# Patient Record
Sex: FEMALE | Race: WHITE | NOT HISPANIC OR LATINO | Employment: UNEMPLOYED | URBAN - METROPOLITAN AREA
[De-identification: names, ages, dates, MRNs, and addresses within clinical notes are randomized per-mention and may not be internally consistent; named-entity substitution may affect disease eponyms.]

---

## 2022-08-17 ENCOUNTER — HOSPITAL ENCOUNTER (EMERGENCY)
Facility: MEDICAL CENTER | Age: 47
End: 2022-08-17
Attending: STUDENT IN AN ORGANIZED HEALTH CARE EDUCATION/TRAINING PROGRAM
Payer: MEDICAID

## 2022-08-17 ENCOUNTER — APPOINTMENT (OUTPATIENT)
Dept: RADIOLOGY | Facility: MEDICAL CENTER | Age: 47
End: 2022-08-17
Attending: STUDENT IN AN ORGANIZED HEALTH CARE EDUCATION/TRAINING PROGRAM
Payer: MEDICAID

## 2022-08-17 VITALS
TEMPERATURE: 96.8 F | DIASTOLIC BLOOD PRESSURE: 68 MMHG | SYSTOLIC BLOOD PRESSURE: 114 MMHG | RESPIRATION RATE: 18 BRPM | OXYGEN SATURATION: 96 % | WEIGHT: 173.06 LBS | HEART RATE: 90 BPM

## 2022-08-17 DIAGNOSIS — L97.511 CHRONIC ULCER OF RIGHT FOOT LIMITED TO BREAKDOWN OF SKIN (HCC): ICD-10-CM

## 2022-08-17 DIAGNOSIS — L97.521 CHRONIC ULCER OF LEFT FOOT LIMITED TO BREAKDOWN OF SKIN (HCC): ICD-10-CM

## 2022-08-17 DIAGNOSIS — L03.011 CELLULITIS OF FINGER OF RIGHT HAND: ICD-10-CM

## 2022-08-17 DIAGNOSIS — L03.012 CELLULITIS OF FINGER OF LEFT HAND: ICD-10-CM

## 2022-08-17 LAB
ALBUMIN SERPL BCP-MCNC: 4.5 G/DL (ref 3.2–4.9)
ALBUMIN/GLOB SERPL: 1.4 G/DL
ALP SERPL-CCNC: 65 U/L (ref 30–99)
ALT SERPL-CCNC: 32 U/L (ref 2–50)
ANION GAP SERPL CALC-SCNC: 13 MMOL/L (ref 7–16)
AST SERPL-CCNC: 24 U/L (ref 12–45)
BASOPHILS # BLD AUTO: 1.2 % (ref 0–1.8)
BASOPHILS # BLD: 0.17 K/UL (ref 0–0.12)
BILIRUB SERPL-MCNC: <0.2 MG/DL (ref 0.1–1.5)
BUN SERPL-MCNC: 12 MG/DL (ref 8–22)
CALCIUM SERPL-MCNC: 9.8 MG/DL (ref 8.5–10.5)
CHLORIDE SERPL-SCNC: 105 MMOL/L (ref 96–112)
CO2 SERPL-SCNC: 22 MMOL/L (ref 20–33)
CREAT SERPL-MCNC: 0.93 MG/DL (ref 0.5–1.4)
CRP SERPL HS-MCNC: <0.3 MG/DL (ref 0–0.75)
EOSINOPHIL # BLD AUTO: 0.33 K/UL (ref 0–0.51)
EOSINOPHIL NFR BLD: 2.4 % (ref 0–6.9)
ERYTHROCYTE [DISTWIDTH] IN BLOOD BY AUTOMATED COUNT: 42.5 FL (ref 35.9–50)
GFR SERPLBLD CREATININE-BSD FMLA CKD-EPI: 77 ML/MIN/1.73 M 2
GLOBULIN SER CALC-MCNC: 3.2 G/DL (ref 1.9–3.5)
GLUCOSE SERPL-MCNC: 82 MG/DL (ref 65–99)
HCT VFR BLD AUTO: 44.3 % (ref 37–47)
HGB BLD-MCNC: 15.1 G/DL (ref 12–16)
IMM GRANULOCYTES # BLD AUTO: 0.06 K/UL (ref 0–0.11)
IMM GRANULOCYTES NFR BLD AUTO: 0.4 % (ref 0–0.9)
LYMPHOCYTES # BLD AUTO: 4.78 K/UL (ref 1–4.8)
LYMPHOCYTES NFR BLD: 34.6 % (ref 22–41)
MCH RBC QN AUTO: 31.1 PG (ref 27–33)
MCHC RBC AUTO-ENTMCNC: 34.1 G/DL (ref 33.6–35)
MCV RBC AUTO: 91.2 FL (ref 81.4–97.8)
MONOCYTES # BLD AUTO: 0.53 K/UL (ref 0–0.85)
MONOCYTES NFR BLD AUTO: 3.8 % (ref 0–13.4)
NEUTROPHILS # BLD AUTO: 7.95 K/UL (ref 2–7.15)
NEUTROPHILS NFR BLD: 57.6 % (ref 44–72)
NRBC # BLD AUTO: 0 K/UL
NRBC BLD-RTO: 0 /100 WBC
PLATELET # BLD AUTO: 398 K/UL (ref 164–446)
PMV BLD AUTO: 8.2 FL (ref 9–12.9)
POTASSIUM SERPL-SCNC: 3.7 MMOL/L (ref 3.6–5.5)
PROT SERPL-MCNC: 7.7 G/DL (ref 6–8.2)
RBC # BLD AUTO: 4.86 M/UL (ref 4.2–5.4)
SODIUM SERPL-SCNC: 140 MMOL/L (ref 135–145)
WBC # BLD AUTO: 13.8 K/UL (ref 4.8–10.8)

## 2022-08-17 PROCEDURE — 85025 COMPLETE CBC W/AUTO DIFF WBC: CPT

## 2022-08-17 PROCEDURE — A9270 NON-COVERED ITEM OR SERVICE: HCPCS | Performed by: STUDENT IN AN ORGANIZED HEALTH CARE EDUCATION/TRAINING PROGRAM

## 2022-08-17 PROCEDURE — 36415 COLL VENOUS BLD VENIPUNCTURE: CPT

## 2022-08-17 PROCEDURE — 73130 X-RAY EXAM OF HAND: CPT | Mod: LT

## 2022-08-17 PROCEDURE — 85652 RBC SED RATE AUTOMATED: CPT

## 2022-08-17 PROCEDURE — 86140 C-REACTIVE PROTEIN: CPT

## 2022-08-17 PROCEDURE — 73130 X-RAY EXAM OF HAND: CPT | Mod: RT

## 2022-08-17 PROCEDURE — 80053 COMPREHEN METABOLIC PANEL: CPT

## 2022-08-17 PROCEDURE — 99284 EMERGENCY DEPT VISIT MOD MDM: CPT

## 2022-08-17 PROCEDURE — 700102 HCHG RX REV CODE 250 W/ 637 OVERRIDE(OP): Performed by: STUDENT IN AN ORGANIZED HEALTH CARE EDUCATION/TRAINING PROGRAM

## 2022-08-17 RX ORDER — SULFAMETHOXAZOLE AND TRIMETHOPRIM 800; 160 MG/1; MG/1
1 TABLET ORAL 2 TIMES DAILY
Qty: 14 TABLET | Refills: 0 | Status: SHIPPED | OUTPATIENT
Start: 2022-08-17 | End: 2022-08-25 | Stop reason: SDUPTHER

## 2022-08-17 RX ORDER — CEPHALEXIN 500 MG/1
500 CAPSULE ORAL 4 TIMES DAILY
Qty: 28 CAPSULE | Refills: 0 | Status: SHIPPED | OUTPATIENT
Start: 2022-08-17 | End: 2022-08-25 | Stop reason: SDUPTHER

## 2022-08-17 RX ORDER — OXYCODONE HYDROCHLORIDE 5 MG/1
10 TABLET ORAL EVERY 4 HOURS PRN
Status: DISCONTINUED | OUTPATIENT
Start: 2022-08-17 | End: 2022-08-18 | Stop reason: HOSPADM

## 2022-08-17 RX ORDER — SULFAMETHOXAZOLE AND TRIMETHOPRIM 800; 160 MG/1; MG/1
1 TABLET ORAL ONCE
Status: COMPLETED | OUTPATIENT
Start: 2022-08-17 | End: 2022-08-17

## 2022-08-17 RX ORDER — CEPHALEXIN 500 MG/1
500 CAPSULE ORAL ONCE
Status: COMPLETED | OUTPATIENT
Start: 2022-08-17 | End: 2022-08-17

## 2022-08-17 RX ADMIN — CEPHALEXIN 500 MG: 500 CAPSULE ORAL at 22:25

## 2022-08-17 RX ADMIN — SULFAMETHOXAZOLE AND TRIMETHOPRIM 1 TABLET: 800; 160 TABLET ORAL at 22:25

## 2022-08-17 RX ADMIN — OXYCODONE 10 MG: 5 TABLET ORAL at 21:48

## 2022-08-18 ENCOUNTER — PHARMACY VISIT (OUTPATIENT)
Dept: PHARMACY | Facility: MEDICAL CENTER | Age: 47
End: 2022-08-18
Payer: COMMERCIAL

## 2022-08-18 LAB — ERYTHROCYTE [SEDIMENTATION RATE] IN BLOOD BY WESTERGREN METHOD: 10 MM/HOUR (ref 0–25)

## 2022-08-18 PROCEDURE — RXMED WILLOW AMBULATORY MEDICATION CHARGE: Performed by: STUDENT IN AN ORGANIZED HEALTH CARE EDUCATION/TRAINING PROGRAM
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
## 2022-08-18 NOTE — ED TRIAGE NOTES
Pt to triage .  Chief Complaint   Patient presents with    Digit Pain    Wound Check     Pt c/o left index finger and right thumb pain/swelling/wound/drainage     Foot Pain     Pt also c/o right heel pain/wound     Pt admits to skin popping meth

## 2022-08-18 NOTE — ED NOTES
Phlebotomist in room attempting to get blood from pt.    Airway patent, Nasal mucosa clear. Mouth with normal mucosa. Throat has no vesicles, no oropharyngeal exudates and uvula is midline.

## 2022-08-18 NOTE — ED NOTES
Pt discharged with discharge instructions provided. Pt given medications to be filled and education. Pt verbalized an understanding. Pt was set up with  for cab. Pt IV removed, angio intact and pressure dressing applied. Pt ambulated out with a steady gait.
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
## 2022-08-18 NOTE — DISCHARGE INSTRUCTIONS
Your wounds do appear to be improving.  It is very important that you follow-up as scheduled with the wound clinic to ensure proper healing.  Please return to the ER if you have worsening pain, spreading redness, fevers, chills, nausea or vomiting.  Please return if you are unable to take your oral antibiotics.    For pain, take Ibuprofen (Motrin, Advil) 600 mg up to every six hours  mg up to every eight hours if your stomach can take it. Instead of Ibuprofen, you can choose Naproxen (Aleve) and take 250-500 mg twice daily. Take Ibuprofen or Naproxen with food to lessen stomach irritation. You may also take Acetaminophen (Tylenol) 500mg (may take up to 1gm) every six hours in addition to Ibuprofen or Naproxen. (Maximum Tylenol 4 gm/day).  I suggest alternating between the ibuprofen and Tylenol every 4 hours for optimal pain relief and adequate spacing of each medication.

## 2022-08-18 NOTE — DISCHARGE PLANNING
ERP requesting assistance in helping Pt pay for her antibiotics.     GEOVANNI met with Pt. Pt is homeless and does not have acitve Medicaid. Pt is staying at the [facility].   GEOVANNI completed and Approved Service for Antibiotics. ERP will send prescription to [facility].   Pt made aware to pick medications up in the afternoon.   GEOVANNI gave Pt cab vouchers  Return to [facility] on discharge.[id number]  [facility] to pharmacy [phone]  Pharmacy to [facility] [phone]    GEOVANNI will give Approved Service information to AM GEOVANNI Uribe to complete and fax to pharmacy.

## 2022-08-22 PROCEDURE — RXMED WILLOW AMBULATORY MEDICATION CHARGE: Performed by: PSYCHIATRY & NEUROLOGY

## 2022-08-24 ENCOUNTER — PHARMACY VISIT (OUTPATIENT)
Dept: PHARMACY | Facility: MEDICAL CENTER | Age: 47
End: 2022-08-24
Payer: COMMERCIAL

## 2022-08-25 PROCEDURE — RXMED WILLOW AMBULATORY MEDICATION CHARGE: Performed by: NURSE PRACTITIONER

## 2022-08-27 ENCOUNTER — PHARMACY VISIT (OUTPATIENT)
Dept: PHARMACY | Facility: MEDICAL CENTER | Age: 47
End: 2022-08-27
Payer: COMMERCIAL

## 2022-09-19 PROCEDURE — RXMED WILLOW AMBULATORY MEDICATION CHARGE: Performed by: PSYCHIATRY & NEUROLOGY

## 2022-09-25 ENCOUNTER — PHARMACY VISIT (OUTPATIENT)
Dept: PHARMACY | Facility: MEDICAL CENTER | Age: 47
End: 2022-09-25
Payer: COMMERCIAL

## 2022-10-31 ENCOUNTER — APPOINTMENT (OUTPATIENT)
Dept: LAB | Facility: MEDICAL CENTER | Age: 47
End: 2022-10-31
Payer: MEDICAID

## 2022-11-10 ENCOUNTER — OFFICE VISIT (OUTPATIENT)
Dept: URGENT CARE | Facility: CLINIC | Age: 47
End: 2022-11-10
Payer: MEDICAID

## 2022-11-10 VITALS
OXYGEN SATURATION: 97 % | BODY MASS INDEX: 27.45 KG/M2 | WEIGHT: 181.1 LBS | HEART RATE: 80 BPM | HEIGHT: 68 IN | SYSTOLIC BLOOD PRESSURE: 108 MMHG | DIASTOLIC BLOOD PRESSURE: 60 MMHG | TEMPERATURE: 97.1 F | RESPIRATION RATE: 16 BRPM

## 2022-11-10 DIAGNOSIS — K08.89 DENTALGIA: ICD-10-CM

## 2022-11-10 PROCEDURE — 99203 OFFICE O/P NEW LOW 30 MIN: CPT | Performed by: PHYSICIAN ASSISTANT

## 2022-11-10 RX ORDER — IBUPROFEN 600 MG/1
600 TABLET ORAL EVERY 6 HOURS PRN
Qty: 30 TABLET | Refills: 1 | Status: SHIPPED | OUTPATIENT
Start: 2022-11-10 | End: 2022-11-24 | Stop reason: SDUPTHER

## 2022-11-10 RX ORDER — KETOROLAC TROMETHAMINE 30 MG/ML
30 INJECTION, SOLUTION INTRAMUSCULAR; INTRAVENOUS ONCE
Status: COMPLETED | OUTPATIENT
Start: 2022-11-10 | End: 2022-11-10

## 2022-11-10 RX ORDER — AMOXICILLIN AND CLAVULANATE POTASSIUM 875; 125 MG/1; MG/1
1 TABLET, FILM COATED ORAL 2 TIMES DAILY
Qty: 20 TABLET | Refills: 0 | Status: SHIPPED | OUTPATIENT
Start: 2022-11-10 | End: 2023-06-19

## 2022-11-10 RX ADMIN — KETOROLAC TROMETHAMINE 30 MG: 30 INJECTION, SOLUTION INTRAMUSCULAR; INTRAVENOUS at 18:23

## 2022-11-10 ASSESSMENT — ENCOUNTER SYMPTOMS
VOMITING: 0
NAUSEA: 0
CHILLS: 0
HEADACHES: 1
FEVER: 0
DIZZINESS: 0

## 2022-11-10 ASSESSMENT — FIBROSIS 4 INDEX: FIB4 SCORE: 0.49

## 2022-11-11 NOTE — PROGRESS NOTES
"  Subjective:     Geri Zamora  is a 47 y.o. female who presents for Tooth Ache (Pt has pain on upper (R) tooth x yesterday )      Other  This is a new problem. The current episode started today. Associated symptoms include headaches. Pertinent negatives include no chills, fever, nausea, rash or vomiting.     Patient presents urgent care noting broken tooth right upper jawline.  She had needed a course of antibiotics around 3 weeks ago for this which did help with pain significantly.  She did have a broken tooth in this area and a \"chunk fell out just over a month ago\".  She denies fevers chills she has had headache.  Denies dizziness.  Complains of mild discomfort to ears.  Notes tenderness over right upper jaw that radiates to her right maxillary sinus as well as some to right lower jaw.  Patient has a scheduled follow-up appointment with dentist and 3 weeks.    Review of Systems   Constitutional:  Negative for chills and fever.   HENT:  Positive for ear pain.         Dental pain   Gastrointestinal:  Negative for nausea and vomiting.   Skin:  Negative for rash.   Neurological:  Positive for headaches. Negative for dizziness.     Medications:    aripiprazole  cephALEXin Caps  DULoxetine Cpep  sulfamethoxazole-trimethoprim    Allergies: Doxycycline, Flexeril [cyclobenzaprine], Gabapentin (phn), Reglan [metoclopramide], and Tramadol    Problem List: Geri Zamora does not have a problem list on file.    Surgical History:  No past surgical history on file.    Past Social Hx: Geri Zamora  reports that she has never smoked. She has never used smokeless tobacco. She reports that she does not currently use alcohol. She reports that she does not currently use drugs after having used the following drugs: Injected (Skin Popping).     Past Family Hx:  Geri Zamora family history is not on file.     Problem list, medications, and allergies reviewed by myself today " "in Epic.     Objective:   /60 (BP Location: Left arm, Patient Position: Sitting, BP Cuff Size: Adult)   Pulse 80   Temp 36.2 °C (97.1 °F) (Temporal)   Resp 16   Ht 1.715 m (5' 7.5\")   Wt 82.1 kg (181 lb 1.6 oz)   SpO2 97%   BMI 27.95 kg/m²     Physical Exam  Vitals and nursing note reviewed.   Constitutional:       General: She is not in acute distress.     Appearance: She is well-developed. She is not diaphoretic.   HENT:      Head: Normocephalic and atraumatic.      Right Ear: Tympanic membrane, ear canal and external ear normal.      Left Ear: Tympanic membrane, ear canal and external ear normal.      Nose: Nose normal.      Mouth/Throat:      Lips: Pink.      Mouth: Mucous membranes are moist.      Dentition: Abnormal dentition. Does not have dentures. Dental tenderness and dental caries present. No gingival swelling, dental abscesses or gum lesions.      Pharynx: Uvula midline. Posterior oropharyngeal erythema ( mild PND) present. No oropharyngeal exudate.      Tonsils: No tonsillar abscesses.        Comments: Broken tooth right upper  Eyes:      General: Lids are normal. No scleral icterus.        Right eye: No discharge.         Left eye: No discharge.      Conjunctiva/sclera: Conjunctivae normal.   Pulmonary:      Effort: Pulmonary effort is normal. No respiratory distress.      Breath sounds: Normal breath sounds. No stridor. No decreased breath sounds, wheezing, rhonchi or rales.   Musculoskeletal:         General: Normal range of motion.      Cervical back: Neck supple.   Skin:     General: Skin is warm and dry.      Coloration: Skin is not pale.      Findings: No erythema.   Neurological:      Mental Status: She is alert and oriented to person, place, and time. She is not disoriented.   Psychiatric:         Speech: Speech normal.         Behavior: Behavior normal.     Toradol 30 mg IM-tolerates well    Assessment/Plan:   Assessment      1. Dentalgia  - amoxicillin-clavulanate (AUGMENTIN) " 875-125 MG Tab; Take 1 Tablet by mouth 2 times a day.  Dispense: 20 Tablet; Refill: 0  - ketorolac (TORADOL) injection 30 mg  - ibuprofen (MOTRIN) 600 MG Tab; Take 1 Tablet by mouth every 6 hours as needed for Inflammation or Moderate Pain.  Dispense: 30 Tablet; Refill: 1  Supportive care is reviewed with patient/caregiver - recommend to push PO fluids and electrolytes,  take full course of Rx, take with probiotics, observe for resolution  Return to clinic with lack of resolution or progression of symptoms.  F/u w/ dentist    I have worn an N95 mask, gloves and eye protection for the entire encounter with this patient.     Differential diagnosis, natural history, supportive care, and indications for immediate follow-up discussed.

## 2022-11-24 ENCOUNTER — HOSPITAL ENCOUNTER (EMERGENCY)
Facility: MEDICAL CENTER | Age: 47
End: 2022-11-24
Attending: EMERGENCY MEDICINE
Payer: MEDICAID

## 2022-11-24 ENCOUNTER — APPOINTMENT (OUTPATIENT)
Dept: RADIOLOGY | Facility: MEDICAL CENTER | Age: 47
End: 2022-11-24
Attending: EMERGENCY MEDICINE
Payer: MEDICAID

## 2022-11-24 VITALS
RESPIRATION RATE: 14 BRPM | TEMPERATURE: 98.5 F | WEIGHT: 183.64 LBS | SYSTOLIC BLOOD PRESSURE: 115 MMHG | OXYGEN SATURATION: 96 % | DIASTOLIC BLOOD PRESSURE: 75 MMHG | HEIGHT: 67 IN | BODY MASS INDEX: 28.82 KG/M2 | HEART RATE: 82 BPM

## 2022-11-24 DIAGNOSIS — S76.011A STRAIN OF RIGHT HIP, INITIAL ENCOUNTER: ICD-10-CM

## 2022-11-24 DIAGNOSIS — K08.89 DENTALGIA: ICD-10-CM

## 2022-11-24 PROCEDURE — 73502 X-RAY EXAM HIP UNI 2-3 VIEWS: CPT | Mod: RT

## 2022-11-24 PROCEDURE — 99283 EMERGENCY DEPT VISIT LOW MDM: CPT

## 2022-11-24 RX ORDER — IBUPROFEN 600 MG/1
600 TABLET ORAL EVERY 6 HOURS PRN
Qty: 30 TABLET | Refills: 1 | Status: SHIPPED | OUTPATIENT
Start: 2022-11-24 | End: 2023-06-19

## 2022-11-24 ASSESSMENT — FIBROSIS 4 INDEX: FIB4 SCORE: 0.49

## 2022-11-24 NOTE — ED TRIAGE NOTES
"Chief Complaint   Patient presents with    Hip Pain     R sided, pt states she kicked her slipper off and felt something \"pop\" in her hip.      /77   Pulse 89   Temp 37 °C (98.6 °F) (Temporal)   Resp 14   Ht 1.702 m (5' 7\")   Wt 83.3 kg (183 lb 10.3 oz)   SpO2 94%   BMI 28.76 kg/m²     Pt ambulatory to triage for above.   "

## 2022-11-24 NOTE — ED NOTES
Pt given discharge instructions/ home care instructions explained, pt verbalized understanding of instructions given/pt understands the importance of follow up, pt ambulatory to ER christiano.

## 2022-11-24 NOTE — ED PROVIDER NOTES
"ED Provider Note      CHIEF COMPLAINT   Chief Complaint   Patient presents with    Hip Pain     R sided, pt states she kicked her slipper off and felt something \"pop\" in her hip.        HPI   Geri Pierce is a 47 y.o. female who presents with right hip pain.  Patient was kicking off her slipper flexing her right hip felt a pop in her right hip.  She localizes this to the right iliac crest region right anterior hip.  Throbbing nonradiating worse with ambulation.    REVIEW OF SYSTEMS   Pertinent negative: No fever, blunt trauma fall rash weakness numbness neurologic symptoms    PAST MEDICAL HISTORY   History reviewed. No pertinent past medical history.    SOCIAL HISTORY  Social History     Tobacco Use    Smoking status: Never    Smokeless tobacco: Never   Vaping Use    Vaping Use: Every day    Substances: Nicotine    Devices: Pre-filled pod   Substance Use Topics    Alcohol use: Not Currently    Drug use: Not Currently     Types: Injected (Skin Popping)     Comment: meth       ALLERGIES   See chart    PHYSICAL EXAM  VITAL SIGNS: /77   Pulse 89   Temp 37 °C (98.6 °F) (Temporal)   Resp 14   Ht 1.702 m (5' 7\")   Wt 83.3 kg (183 lb 10.3 oz)   SpO2 94%   BMI 28.76 kg/m²   Head: Atraumatic  Eyes: Eyes normal inspection  Neck: has full range of motion, normal inspection.  Constitutional: No acute distress   Cardiovascular: Normal heart rate. Pulses strong dorsalis pedis  Thorax & Lungs: No respiratory distress  Skin: Intact  Musculoskeletal: Pain with range of motion of the right hip.  No deformity shortening abnormal rotation.  No rash or swelling compartments soft.  Neurologic:  Normal sensory and motor    RADIOLOGY/PROCEDURES  DX-HIP-COMPLETE - UNILATERAL 2+ RIGHT   Final Result      1.  No RIGHT hip or pelvic fracture.   2.  Mild degenerative change of both hips.         Imaging is interpreted by radiologist     COURSE & MEDICAL DECISION MAKING  Patient presents with right hip pain after flexion.  " No blunt trauma.  X-ray without fracture or avulsion.  Placed on crutches.  Advised rest ice Tylenol and ibuprofen.  Wrote a prescription for ibuprofen.  Refer to orthopedics for recheck if persistent pain.    FINAL IMPRESSION  1.  Right hip strain      This dictation was created using voice recognition software. The accuracy of the dictation is limited to the abilities of the software. I expect there may be some errors of grammar and possibly content. The nursing notes were reviewed and certain aspects of this information were incorporated into this note.      Electronically signed by: Garret Juarez M.D., 11/24/2022 10:58 AM

## 2023-06-19 ENCOUNTER — HOSPITAL ENCOUNTER (EMERGENCY)
Facility: MEDICAL CENTER | Age: 48
End: 2023-06-19
Attending: EMERGENCY MEDICINE
Payer: MEDICAID

## 2023-06-19 ENCOUNTER — APPOINTMENT (OUTPATIENT)
Dept: RADIOLOGY | Facility: MEDICAL CENTER | Age: 48
End: 2023-06-19
Attending: EMERGENCY MEDICINE
Payer: MEDICAID

## 2023-06-19 VITALS
HEART RATE: 60 BPM | HEIGHT: 67 IN | RESPIRATION RATE: 14 BRPM | WEIGHT: 185 LBS | DIASTOLIC BLOOD PRESSURE: 63 MMHG | OXYGEN SATURATION: 95 % | SYSTOLIC BLOOD PRESSURE: 99 MMHG | TEMPERATURE: 96.9 F | BODY MASS INDEX: 29.03 KG/M2

## 2023-06-19 DIAGNOSIS — S70.01XA CONTUSION OF RIGHT HIP, INITIAL ENCOUNTER: ICD-10-CM

## 2023-06-19 DIAGNOSIS — F32.A DEPRESSION, UNSPECIFIED DEPRESSION TYPE: ICD-10-CM

## 2023-06-19 DIAGNOSIS — S80.00XA CONTUSION OF KNEE, UNSPECIFIED LATERALITY, INITIAL ENCOUNTER: ICD-10-CM

## 2023-06-19 DIAGNOSIS — F15.10 AMPHETAMINE ABUSE (HCC): ICD-10-CM

## 2023-06-19 DIAGNOSIS — R45.851 SUICIDAL IDEATION: ICD-10-CM

## 2023-06-19 LAB
AMPHET UR QL SCN: POSITIVE
BARBITURATES UR QL SCN: NEGATIVE
BENZODIAZ UR QL SCN: NEGATIVE
BZE UR QL SCN: NEGATIVE
CANNABINOIDS UR QL SCN: POSITIVE
FENTANYL UR QL: NEGATIVE
METHADONE UR QL SCN: NEGATIVE
OPIATES UR QL SCN: NEGATIVE
OXYCODONE UR QL SCN: NEGATIVE
PCP UR QL SCN: NEGATIVE
POC BREATHALIZER: 0 PERCENT (ref 0–0.01)
PROPOXYPH UR QL SCN: NEGATIVE

## 2023-06-19 PROCEDURE — 90791 PSYCH DIAGNOSTIC EVALUATION: CPT

## 2023-06-19 PROCEDURE — 73501 X-RAY EXAM HIP UNI 1 VIEW: CPT | Mod: RT

## 2023-06-19 PROCEDURE — 73562 X-RAY EXAM OF KNEE 3: CPT | Mod: LT

## 2023-06-19 PROCEDURE — 302970 POC BREATHALIZER: Performed by: EMERGENCY MEDICINE

## 2023-06-19 PROCEDURE — 700102 HCHG RX REV CODE 250 W/ 637 OVERRIDE(OP): Mod: UD | Performed by: EMERGENCY MEDICINE

## 2023-06-19 PROCEDURE — 99285 EMERGENCY DEPT VISIT HI MDM: CPT

## 2023-06-19 PROCEDURE — 73562 X-RAY EXAM OF KNEE 3: CPT | Mod: RT

## 2023-06-19 PROCEDURE — 80307 DRUG TEST PRSMV CHEM ANLYZR: CPT

## 2023-06-19 PROCEDURE — A9270 NON-COVERED ITEM OR SERVICE: HCPCS | Mod: UD | Performed by: EMERGENCY MEDICINE

## 2023-06-19 PROCEDURE — 302970 POC BREATHALIZER

## 2023-06-19 PROCEDURE — 73130 X-RAY EXAM OF HAND: CPT | Mod: LT

## 2023-06-19 RX ORDER — IBUPROFEN 600 MG/1
600 TABLET ORAL 3 TIMES DAILY PRN
Status: DISCONTINUED | OUTPATIENT
Start: 2023-06-19 | End: 2023-06-19 | Stop reason: HOSPADM

## 2023-06-19 RX ORDER — BREXPIPRAZOLE 2 MG/1
2 TABLET ORAL DAILY
COMMUNITY

## 2023-06-19 RX ORDER — ZONISAMIDE 100 MG/1
300 CAPSULE ORAL 2 TIMES DAILY
COMMUNITY

## 2023-06-19 RX ORDER — GLUCOSAMINE/D3/BOSWELLIA SERRA 1500MG-400
10000 TABLET ORAL DAILY
COMMUNITY

## 2023-06-19 RX ORDER — VITAMIN B COMPLEX
1000 TABLET ORAL DAILY
COMMUNITY

## 2023-06-19 RX ORDER — M-VIT,TX,IRON,MINS/CALC/FOLIC 27MG-0.4MG
1 TABLET ORAL DAILY
COMMUNITY

## 2023-06-19 RX ORDER — BUSPIRONE HYDROCHLORIDE 10 MG/1
10 TABLET ORAL 2 TIMES DAILY
COMMUNITY

## 2023-06-19 RX ORDER — BUPROPION HYDROCHLORIDE 200 MG/1
600 TABLET, EXTENDED RELEASE ORAL DAILY
COMMUNITY

## 2023-06-19 RX ADMIN — IBUPROFEN 600 MG: 600 TABLET, FILM COATED ORAL at 19:14

## 2023-06-19 ASSESSMENT — FIBROSIS 4 INDEX: FIB4 SCORE: 0.49

## 2023-06-19 NOTE — ED NOTES
Urine sample collected and sent to lab. Patient confirms not on any antibiotic for the last 30 days and denies SI now.

## 2023-06-19 NOTE — ED NOTES
Bedside report received from Chantelle DOTY. Assumed care of patient. Fall precautions in place. This RN in appropriate PPE during encounter.  Patient resting on gurney. No acute distress. Active chest rise noted. No agitation noted. No behavioral pain indicators. 1:1 sitter in direct view of patient.

## 2023-06-19 NOTE — DISCHARGE PLANNING
Alert Team Note:    Faxed pharmacy tech note to [facility] as requested and transferred [facility]'s call to LALITHA Marie for medication clarification.

## 2023-06-19 NOTE — DISCHARGE PLANNING
[facility] ALERT TEAM DISCHARGE PLANNING NOTE    Date:  6/19/23  Patient Name:  Geri Pierce - 47 y.o. - Discharge Planning  MRN:  [id number]   YOB: 1975  ADMISSION DATE:  6/19/2023     Writer forwarded referral packet for inpatient psychiatric care to the following community providers:  [facility], [facility]    Items included in the referral packet:   __x___Face Sheet   __x___Pages 1 and 2 of completed legal hold   __x___Alert Team/Psych Assessment   __x___H&P   _____UDS   __x___Blood Alcohol   __x___Vital signs   _____Pregnancy Test (if applicable)   __x___Medications List   _____Covid Screen

## 2023-06-19 NOTE — ED NOTES
Med rec updated and complete. Allergies reviewed.  Confirmed name and  date of birth.  Interviewed pt at bedside. Pt is taking her medications differently than it is prescribed.  Pt should be taking  Bupropion  400 mg AM and 200 mg afternoon. However, pt has been taking 600 mg in the AM  Zonisamide 200 mg TID. However, pt is taking zonisamide 300mg BID.      Home pharmacy  [facility] = [phone]

## 2023-06-19 NOTE — ED NOTES
Pt's personal medications taken to central pharmacy. 1 bag of belongings, water bottle, and shoe box placed in L[facility].
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
## 2023-06-19 NOTE — ED NOTES
1:1 sitter initiated. All personal belongings removed from pt's room and pt placed in hospital gown with ties removed.

## 2023-06-19 NOTE — ED NOTES
Spoke with Angelita DOTY from [facility] regarding patient status. Wanting to confirm if patient is still on Augmentin.

## 2023-06-19 NOTE — ED NOTES
Patient reports only started drinking again less than a week ago and has been sober for more than a year. Denies history of delirium tremens. Last drink was Saturday 06/17/2023. CIWA=0. In the span of 4 days where she started drinking last week, she reports to have only consumed a quarter of vodka.
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
## 2023-06-20 NOTE — ED NOTES
Report received from LALITHA Mcknight. Pt is A&Ox4 and currently denies any SI/HI. Stop sign in use. Pt in hospital clothing. Potentially dangerous equipment not in room. Continuous visual monitoring by Trained Personnel in place. Sitter has unobstructed view of patient at all times. Discussion with sitter about patient care, safety, and support.
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
## 2023-06-20 NOTE — DISCHARGE SUMMARY
"  ED Observation Discharge Summary    Patient:Geri Pierce  Patient : 1975  Patient MRN: [id number]  Patient PCP: Fransisco Robertson D.O.    Admit Date: 2023  Discharge Date and Time: 23 9:00 PM  Discharge Diagnosis:   1. Suicidal ideation        2. Depression, unspecified depression type        3. Contusion of knee, unspecified laterality, initial encounter        4. Contusion of right hip, initial encounter           Discharge Attending: Shorty Peralta M.D.  Discharge Service: ED Observation    ED Course  Geri is a 47 y.o. female who was evaluated at [facility] with suicidal ideation.  Was initially evaluated this morning, medically cleared and was evaluated by the psychiatric evaluation team.  Her legal hold was certified and ultimately a bed has opened up at a local psychiatric facility she is being transferred there for further psychiatric treatment    Discharge Exam:  /85   Pulse 75   Temp 35.9 °C (96.7 °F) (Temporal)   Resp 17   Ht 1.702 m (5' 7\")   Wt 83.9 kg (185 lb)   SpO2 95%   BMI 28.98 kg/m² .    Constitutional: Awake and alert. Nontoxic  HENT:  Grossly normal  Eyes: Grossly normal  Neck: Normal range of motion  Thorax & Lungs: No respiratory distress      Labs  Results for orders placed or performed during the hospital encounter of 23   Urine Drug Screen   Result Value Ref Range    Amphetamines Urine Positive (A) Negative    Barbiturates Negative Negative    Benzodiazepines Negative Negative    Cocaine Metabolite Negative Negative    Fentanyl, Urine Negative Negative    Methadone Negative Negative    Opiates Negative Negative    Oxycodone Negative Negative    Phencyclidine -Pcp Negative Negative    Propoxyphene Negative Negative    Cannabinoid Metab Positive (A) Negative   POC BREATHALIZER   Result Value Ref Range    POC Breathalizer 0.00 0.00 - 0.01 Percent       Radiology  DX-KNEE 3 VIEWS LEFT   Final Result      1.  No radiographic " evidence of acute traumatic injury.   2.  Mild joint space narrowing of the medial compartment.      DX-KNEE 3 VIEWS RIGHT   Final Result      1.  No radiographic evidence of acute traumatic injury.   2.  Mild joint space narrowing of the medial compartment.      DX-HIP-UNILATERAL-WITH PELVIS-1 VIEW RIGHT   Final Result      No radiographic evidence of acute traumatic injury.      DX-HAND 3+ LEFT   Final Result      1.  No radiographic evidence of acute traumatic injury. If symptoms persist, follow-up radiographs can be obtained in 7-10 days.   2.  Degenerative changes of the basal joints of thumb.          Medications:   New Prescriptions    No medications on file       My final assessment includes   1. Suicidal ideation        2. Depression, unspecified depression type        3. Contusion of knee, unspecified laterality, initial encounter        4. Contusion of right hip, initial encounter        5. Amphetamine abuse (HCC)           Upon Reevaluation, the patient's condition has: not improved; and will be escalated to psychiatric hospitalization.    Patient discharged from ED Observation status at 9 PM (Time) 6/19/2023 (Date).     Total time spent on this ED Observation discharge encounter is < 30 Minutes    Electronically signed by: Shorty Peralta M.D., 6/19/2023 8:04 PM

## 2023-06-20 NOTE — ED NOTES
Bedside report given to Meghan DOTY. 1:1 sitter in direct view of patient. Awaiting inpatient psych acceptance.

## 2023-06-20 NOTE — ED NOTES
Belongings retrieved from locker 3 and home medications retrieved from central pharmacy. Belongings and medications handed to EMS. Pt transported off unit with EMS transport. Pt awake and breathing with even, unlabored respirations on RA at time of transport.

## 2023-06-20 NOTE — DISCHARGE PLANNING
Alert Team:     Referral: Adult Patient Transfer to Mental Health Facility     Intervention: Received call from Jemal at [facility] stating that Dr. Jennings has accepted the patient for admission.  Facility requests that transport be arranged for 2130.     Arranged for transportation to be set up through [place] with [facility] for [facility] transport.     The pt will be picked up at 2130.      Notified the RN of the departure time as well as accepting facility.      Created transfer packet and placed on chart.      Plan: Pt will transfer to [facility] at 2130.

## 2023-06-20 NOTE — ED NOTES
Patient arrived to [facility] procedure room. Phone report to Jojo at [facility]. All questions answered.